# Patient Record
Sex: FEMALE | Race: OTHER | HISPANIC OR LATINO
[De-identification: names, ages, dates, MRNs, and addresses within clinical notes are randomized per-mention and may not be internally consistent; named-entity substitution may affect disease eponyms.]

---

## 2018-04-10 ENCOUNTER — RESULT REVIEW (OUTPATIENT)
Age: 43
End: 2018-04-10

## 2018-04-11 PROBLEM — Z00.00 ENCOUNTER FOR PREVENTIVE HEALTH EXAMINATION: Status: ACTIVE | Noted: 2018-04-11

## 2018-04-27 ENCOUNTER — APPOINTMENT (OUTPATIENT)
Dept: PLASTIC SURGERY | Facility: CLINIC | Age: 43
End: 2018-04-27

## 2022-04-03 ENCOUNTER — EMERGENCY (EMERGENCY)
Facility: HOSPITAL | Age: 47
LOS: 1 days | Discharge: HOME | End: 2022-04-03
Attending: EMERGENCY MEDICINE | Admitting: EMERGENCY MEDICINE
Payer: COMMERCIAL

## 2022-04-03 VITALS
HEIGHT: 66 IN | OXYGEN SATURATION: 97 % | HEART RATE: 96 BPM | DIASTOLIC BLOOD PRESSURE: 94 MMHG | WEIGHT: 179.9 LBS | SYSTOLIC BLOOD PRESSURE: 145 MMHG | RESPIRATION RATE: 18 BRPM | TEMPERATURE: 98 F

## 2022-04-03 PROCEDURE — 99283 EMERGENCY DEPT VISIT LOW MDM: CPT

## 2022-04-03 RX ORDER — NEBIVOLOL HYDROCHLORIDE 5 MG/1
0 TABLET ORAL
Qty: 0 | Refills: 0 | DISCHARGE

## 2022-04-03 RX ORDER — LEVOTHYROXINE SODIUM 125 MCG
0 TABLET ORAL
Qty: 0 | Refills: 0 | DISCHARGE

## 2022-04-03 NOTE — ED PROVIDER NOTE - CLINICAL SUMMARY MEDICAL DECISION MAKING FREE TEXT BOX
46F PMH hypothyroid p/w throat pain. Pt states that she was talking a lot yesterday, then went to watch basketball game - developed gradual onset of sore throat. Today awoke feeling mild pain to throat but also felt like her throat was a little tight  - she looked in the mirror and couldn't see the back of her throat and was concerned it may be closing so she came to ED. No other systemic symptoms.   Vitals wnl, exam as above. Very well appearing.   ddx: Pain likely 2/2 voice overuse, possible developing mild pharyngitis. clinically no significant airway involvement.   Discussed importance of outpt follow up and return precautions. Clinically no indication for further emergent ED workup or hospitalization at this time. Comfortable for dc, outpt f/u.

## 2022-04-03 NOTE — ED PROVIDER NOTE - PHYSICAL EXAMINATION
No tonsillar hypertrophy, exudates, erythema. No obvious LAD. No trismus. No stridor/drooling, neck FROM. Normal sounding voice. Uvula midline. No facial swelling.   no LE edema, normal equal distal pulses, steady unassisted gait.   vitiligo

## 2022-04-03 NOTE — ED ADULT NURSE NOTE - OBJECTIVE STATEMENT
Pt presented to the ED with complaints of sore throat. As per pt, she has a history of laryngitis and feels that this might be the same. Pt is alert and oriented, speaking in full sentences, no respiratory distress noted.

## 2022-04-03 NOTE — ED PROVIDER NOTE - OBJECTIVE STATEMENT
46F PMH hypothyroid p/w throat pain. Pt states that she was talking a lot yesterday, then went to watch basketball game - developed gradual onset of sore throat. Today awoke feeling mild pain to throat but also felt like her throat was a little tight  - she looked in the mirror and couldn't see the back of her throat and was concerned it may be closing so she came to ED. No other systemic symptoms.   Denies HA, f/c, SOB/CP, rhinorrhea, cough, abd pain, NVD. Normal PO intake.

## 2022-04-03 NOTE — ED PROVIDER NOTE - NSFOLLOWUPINSTRUCTIONS_ED_ALL_ED_FT
Can take tylenol 650mg or motrin 600mg (May cause stomach irritation - take with food and avoid prolonged use) every 6hrs as needed for pain or fever.    Stay well hydrated.    Return to ER for worsening pain, worsening ability to swallow, worsening voice changes, decreased ability yo move neck, persistent fevers, persistent vomit, worsening breathing, worsening lightheaded. You may need further intervention or treatment at that time.    Follow up with primary doctor within 1-2 days.     Follow up with ENT for persistent symptoms. Can follow up at [facility] ([facility]). Can call [phone] to schedule appointment.

## 2022-04-03 NOTE — ED PROVIDER NOTE - PATIENT PORTAL LINK FT
You can access the FollowMyHealth Patient Portal offered by [facility] by registering at the following website: [email or web address]. By joining [facility]’s FollowMyHealth portal, you will also be able to view your health information using other applications (apps) compatible with our system.

## 2022-04-06 DIAGNOSIS — E03.9 HYPOTHYROIDISM, UNSPECIFIED: ICD-10-CM

## 2022-04-06 DIAGNOSIS — R07.0 PAIN IN THROAT: ICD-10-CM
